# Patient Record
Sex: MALE | Race: BLACK OR AFRICAN AMERICAN | Employment: OTHER | URBAN - METROPOLITAN AREA
[De-identification: names, ages, dates, MRNs, and addresses within clinical notes are randomized per-mention and may not be internally consistent; named-entity substitution may affect disease eponyms.]

---

## 2017-06-19 ENCOUNTER — HOSPITAL ENCOUNTER (OUTPATIENT)
Dept: CT IMAGING | Age: 72
Discharge: HOME | End: 2017-06-19
Attending: OTOLARYNGOLOGY
Payer: MEDICARE

## 2017-06-19 DIAGNOSIS — K11.8 SUBMANDIBULAR GLAND MASS: ICD-10-CM

## 2017-06-19 LAB — CREAT BLD-MCNC: 1.2 MG/DL (ref 0.6–1.3)

## 2017-06-19 PROCEDURE — 82565 ASSAY OF CREATININE: CPT

## 2017-06-19 PROCEDURE — 74011250636 HC RX REV CODE- 250/636: Performed by: OTOLARYNGOLOGY

## 2017-06-19 PROCEDURE — 70491 CT SOFT TISSUE NECK W/DYE: CPT

## 2017-06-19 PROCEDURE — 74011636320 HC RX REV CODE- 636/320: Performed by: OTOLARYNGOLOGY

## 2017-06-19 RX ORDER — SODIUM CHLORIDE 9 MG/ML
50 INJECTION, SOLUTION INTRAVENOUS
Status: COMPLETED | OUTPATIENT
Start: 2017-06-19 | End: 2017-06-19

## 2017-06-19 RX ORDER — SODIUM CHLORIDE 0.9 % (FLUSH) 0.9 %
10 SYRINGE (ML) INJECTION
Status: COMPLETED | OUTPATIENT
Start: 2017-06-19 | End: 2017-06-19

## 2017-06-19 RX ADMIN — Medication 10 ML: at 11:31

## 2017-06-19 RX ADMIN — SODIUM CHLORIDE 50 ML/HR: 900 INJECTION, SOLUTION INTRAVENOUS at 11:31

## 2017-06-19 RX ADMIN — IOPAMIDOL 100 ML: 612 INJECTION, SOLUTION INTRAVENOUS at 11:31

## 2017-07-19 RX ORDER — POTASSIUM CHLORIDE 20 MEQ/1
20 TABLET, EXTENDED RELEASE ORAL DAILY
COMMUNITY

## 2017-07-19 RX ORDER — PRAVASTATIN SODIUM 10 MG/1
10 TABLET ORAL
COMMUNITY

## 2017-07-19 RX ORDER — ASPIRIN 81 MG/1
81 TABLET ORAL DAILY
COMMUNITY
End: 2017-07-25

## 2017-07-19 RX ORDER — AMIODARONE HYDROCHLORIDE 200 MG/1
100 TABLET ORAL DAILY
COMMUNITY

## 2017-07-19 RX ORDER — HYDROCHLOROTHIAZIDE 25 MG/1
25 TABLET ORAL DAILY
COMMUNITY

## 2017-07-19 RX ORDER — LISINOPRIL 40 MG/1
40 TABLET ORAL DAILY
COMMUNITY

## 2017-07-19 RX ORDER — METOPROLOL TARTRATE 50 MG/1
50 TABLET ORAL 2 TIMES DAILY
COMMUNITY

## 2017-07-19 NOTE — PERIOP NOTES
Call to [facility], [facility] rep. Notified of pt's upcoming surgery date, procedure, and use of bovie (per OR staff). Per Tosh, magnet needed for device for duration of case, no post op interrogation required.

## 2017-07-19 NOTE — PERIOP NOTES
[facility]  Ambulatory Surgery Unit  Pre-operative Instructions    Surgery/Procedure Date  7/25            Tentative Arrival Time tba      1. On the day of your surgery/procedure, please report to the Ambulatory Surgery Unit Registration Desk and sign in at your designated time. The Ambulatory Surgery Unit is located in [facility] on the [facility] side of the [facility] across from the [address] [place] [address]. Please have all of your health insurance cards and a photo ID. 2. You must have someone with you to drive you home, as you should not drive a car for 24 hours following anesthesia. Please make arrangements for a responsible adult friend or family member to stay with you for at least the first 24 hours after your surgery. 3. Do not have anything to eat or drink (including water, gum, mints, coffee, juice) after midnight 7/24. This may not apply to medications prescribed by your physician. (Please note below the special instructions with medications to take the morning of surgery, if applicable.)    4. We recommend you do not drink any alcoholic beverages for 24 hours before and after your surgery. 5. Stop all Aspirin, non-steroidal anti-inflammatory drugs (i.e. Advil, Aleve), vitamins, and supplements as directed by your surgeon's office. **If you are currently taking Plavix, Coumadin, or other blood-thinning agents, contact your surgeon for instructions. **    6. In an effort to help prevent surgical site infection, we ask that you shower with an anti-bacterial soap (i.e. Dial or Safeguard) for 3 days prior to and on the morning of surgery, using a fresh towel after each shower. (Please begin this process with fresh bed linens.) Do not apply any lotions, powders, or deodorants after the shower on the day of your procedure. If applicable, please do not shave the operative site for 48 hours prior to surgery. 7. Wear comfortable clothes. Wear glasses instead of contacts.  Do not bring any jewelry or money (other than copays or fees as instructed). Do not wear make-up, particularly mascara, the morning of your surgery. Do not wear nail polish, particularly if you are having foot /hand surgery. Wear your hair loose or down, no ponytails, buns, usman pins or clips. All body piercings must be removed. 8. You should understand that if you do not follow these instructions your surgery may be cancelled. If your physical condition changes (i.e. fever, cold or flu) please contact your surgeon as soon as possible. 9. It is important that you be on time. If a situation occurs where you may be late, or if you have any questions or problems, please call [phone].    10. Your surgery time may be subject to change. You will receive a phone call the day prior to surgery to confirm your arrival time. Special Instructions: Take all medications and inhalers, as prescribed, on the morning of surgery with a sip of water EXCEPT: none      I understand a pre-operative phone call will be made to verify my surgery time. In the event that I am not available, I give permission for a message to be left on my answering service and/or with another person?       yes    Reviewed by phone with wife, verbalized understanding.     ___________________      ___________________      ________________  (Signature of Patient)          (Witness)                   (Date and Time)

## 2017-07-21 ENCOUNTER — ANESTHESIA EVENT (OUTPATIENT)
Dept: SURGERY | Age: 72
End: 2017-07-21
Payer: MEDICARE

## 2017-07-24 NOTE — PERIOP NOTES
Pre op chart to Dr. Bob Martin for review, including pcp notes/labs and cardio notes. Also notified of [facility] rep instructions. Pt on HCTZ and K+ supplement every day. Per Dr. Bob Martin, ok to proceed with planned procedure without further.

## 2017-07-25 ENCOUNTER — ANESTHESIA (OUTPATIENT)
Dept: SURGERY | Age: 72
End: 2017-07-25
Payer: MEDICARE

## 2017-07-25 ENCOUNTER — HOSPITAL ENCOUNTER (OUTPATIENT)
Age: 72
Setting detail: OUTPATIENT SURGERY
Discharge: HOME | End: 2017-07-25
Attending: OTOLARYNGOLOGY | Admitting: OTOLARYNGOLOGY
Payer: MEDICARE

## 2017-07-25 VITALS
TEMPERATURE: 97.4 F | DIASTOLIC BLOOD PRESSURE: 96 MMHG | SYSTOLIC BLOOD PRESSURE: 175 MMHG | WEIGHT: 151 LBS | RESPIRATION RATE: 18 BRPM | HEART RATE: 74 BPM | OXYGEN SATURATION: 96 % | HEIGHT: 64 IN | BODY MASS INDEX: 25.78 KG/M2

## 2017-07-25 PROCEDURE — 74011000250 HC RX REV CODE- 250

## 2017-07-25 PROCEDURE — 74011250636 HC RX REV CODE- 250/636: Performed by: OTOLARYNGOLOGY

## 2017-07-25 PROCEDURE — 74011250636 HC RX REV CODE- 250/636: Performed by: ANESTHESIOLOGY

## 2017-07-25 PROCEDURE — 77030013079 HC BLNKT BAIR HGGR 3M -A: Performed by: NURSE ANESTHETIST, CERTIFIED REGISTERED

## 2017-07-25 PROCEDURE — 77030002916 HC SUT ETHLN J&J -A: Performed by: OTOLARYNGOLOGY

## 2017-07-25 PROCEDURE — 77030020255 HC SOL INJ LR 1000ML BG: Performed by: OTOLARYNGOLOGY

## 2017-07-25 PROCEDURE — 77030019908 HC STETH ESOPH SIMS -A: Performed by: NURSE ANESTHETIST, CERTIFIED REGISTERED

## 2017-07-25 PROCEDURE — 77030002888 HC SUT CHRMC J&J -A: Performed by: OTOLARYNGOLOGY

## 2017-07-25 PROCEDURE — 77030021678 HC GLIDESCP STAT DISP VERT -B: Performed by: NURSE ANESTHETIST, CERTIFIED REGISTERED

## 2017-07-25 PROCEDURE — 74011250636 HC RX REV CODE- 250/636

## 2017-07-25 PROCEDURE — 76030000001 HC AMB SURG OR TIME 1 TO 1.5: Performed by: OTOLARYNGOLOGY

## 2017-07-25 PROCEDURE — 77030010507 HC ADH SKN DERMBND J&J -B: Performed by: OTOLARYNGOLOGY

## 2017-07-25 PROCEDURE — 77030008467 HC STPLR SKN COVD -B: Performed by: OTOLARYNGOLOGY

## 2017-07-25 PROCEDURE — 77030011640 HC PAD GRND REM COVD -A: Performed by: OTOLARYNGOLOGY

## 2017-07-25 PROCEDURE — 74011000250 HC RX REV CODE- 250: Performed by: OTOLARYNGOLOGY

## 2017-07-25 PROCEDURE — 77030008684 HC TU ET CUF COVD -B: Performed by: NURSE ANESTHETIST, CERTIFIED REGISTERED

## 2017-07-25 PROCEDURE — 76210000046 HC AMBSU PH II REC FIRST 0.5 HR: Performed by: OTOLARYNGOLOGY

## 2017-07-25 PROCEDURE — 77030018836 HC SOL IRR NACL ICUM -A: Performed by: OTOLARYNGOLOGY

## 2017-07-25 PROCEDURE — 76060000062 HC AMB SURG ANES 1 TO 1.5 HR: Performed by: OTOLARYNGOLOGY

## 2017-07-25 PROCEDURE — 88307 TISSUE EXAM BY PATHOLOGIST: CPT | Performed by: OTOLARYNGOLOGY

## 2017-07-25 PROCEDURE — 76210000034 HC AMBSU PH I REC 0.5 TO 1 HR: Performed by: OTOLARYNGOLOGY

## 2017-07-25 PROCEDURE — 77030018850 HC STOCK ANTIEMB THG COVD -A: Performed by: OTOLARYNGOLOGY

## 2017-07-25 RX ORDER — SODIUM CHLORIDE 0.9 % (FLUSH) 0.9 %
5-10 SYRINGE (ML) INJECTION AS NEEDED
Status: DISCONTINUED | OUTPATIENT
Start: 2017-07-25 | End: 2017-07-25 | Stop reason: HOSPADM

## 2017-07-25 RX ORDER — PHENYLEPHRINE HCL IN 0.9% NACL 0.4MG/10ML
SYRINGE (ML) INTRAVENOUS AS NEEDED
Status: DISCONTINUED | OUTPATIENT
Start: 2017-07-25 | End: 2017-07-25 | Stop reason: HOSPADM

## 2017-07-25 RX ORDER — FENTANYL CITRATE 50 UG/ML
25 INJECTION, SOLUTION INTRAMUSCULAR; INTRAVENOUS
Status: DISCONTINUED | OUTPATIENT
Start: 2017-07-25 | End: 2017-07-25 | Stop reason: HOSPADM

## 2017-07-25 RX ORDER — LIDOCAINE HYDROCHLORIDE 10 MG/ML
0.1 INJECTION, SOLUTION EPIDURAL; INFILTRATION; INTRACAUDAL; PERINEURAL AS NEEDED
Status: DISCONTINUED | OUTPATIENT
Start: 2017-07-25 | End: 2017-07-25 | Stop reason: HOSPADM

## 2017-07-25 RX ORDER — NALOXONE HYDROCHLORIDE 0.4 MG/ML
INJECTION, SOLUTION INTRAMUSCULAR; INTRAVENOUS; SUBCUTANEOUS AS NEEDED
Status: DISCONTINUED | OUTPATIENT
Start: 2017-07-25 | End: 2017-07-25 | Stop reason: HOSPADM

## 2017-07-25 RX ORDER — DIPHENHYDRAMINE HYDROCHLORIDE 50 MG/ML
12.5 INJECTION, SOLUTION INTRAMUSCULAR; INTRAVENOUS
Status: DISCONTINUED | OUTPATIENT
Start: 2017-07-25 | End: 2017-07-25 | Stop reason: HOSPADM

## 2017-07-25 RX ORDER — LIDOCAINE HYDROCHLORIDE 20 MG/ML
INJECTION, SOLUTION EPIDURAL; INFILTRATION; INTRACAUDAL; PERINEURAL AS NEEDED
Status: DISCONTINUED | OUTPATIENT
Start: 2017-07-25 | End: 2017-07-25 | Stop reason: HOSPADM

## 2017-07-25 RX ORDER — GLYCOPYRROLATE 0.2 MG/ML
INJECTION INTRAMUSCULAR; INTRAVENOUS AS NEEDED
Status: DISCONTINUED | OUTPATIENT
Start: 2017-07-25 | End: 2017-07-25 | Stop reason: HOSPADM

## 2017-07-25 RX ORDER — CLINDAMYCIN PHOSPHATE 900 MG/50ML
INJECTION INTRAVENOUS AS NEEDED
Status: DISCONTINUED | OUTPATIENT
Start: 2017-07-25 | End: 2017-07-25 | Stop reason: HOSPADM

## 2017-07-25 RX ORDER — CLINDAMYCIN HYDROCHLORIDE 150 MG/1
150 CAPSULE ORAL 3 TIMES DAILY
Qty: 21 CAP | Refills: 0 | Status: SHIPPED | OUTPATIENT
Start: 2017-07-25

## 2017-07-25 RX ORDER — HYDROMORPHONE HYDROCHLORIDE 1 MG/ML
.2-.5 INJECTION, SOLUTION INTRAMUSCULAR; INTRAVENOUS; SUBCUTANEOUS
Status: DISCONTINUED | OUTPATIENT
Start: 2017-07-25 | End: 2017-07-25 | Stop reason: HOSPADM

## 2017-07-25 RX ORDER — SUCCINYLCHOLINE CHLORIDE 20 MG/ML
INJECTION INTRAMUSCULAR; INTRAVENOUS AS NEEDED
Status: DISCONTINUED | OUTPATIENT
Start: 2017-07-25 | End: 2017-07-25 | Stop reason: HOSPADM

## 2017-07-25 RX ORDER — ROCURONIUM BROMIDE 10 MG/ML
INJECTION, SOLUTION INTRAVENOUS AS NEEDED
Status: DISCONTINUED | OUTPATIENT
Start: 2017-07-25 | End: 2017-07-25 | Stop reason: HOSPADM

## 2017-07-25 RX ORDER — SODIUM CHLORIDE, SODIUM LACTATE, POTASSIUM CHLORIDE, CALCIUM CHLORIDE 600; 310; 30; 20 MG/100ML; MG/100ML; MG/100ML; MG/100ML
25 INJECTION, SOLUTION INTRAVENOUS CONTINUOUS
Status: DISCONTINUED | OUTPATIENT
Start: 2017-07-25 | End: 2017-07-25 | Stop reason: HOSPADM

## 2017-07-25 RX ORDER — DEXAMETHASONE SODIUM PHOSPHATE 4 MG/ML
10 INJECTION, SOLUTION INTRA-ARTICULAR; INTRALESIONAL; INTRAMUSCULAR; INTRAVENOUS; SOFT TISSUE ONCE
Status: COMPLETED | OUTPATIENT
Start: 2017-07-25 | End: 2017-07-25

## 2017-07-25 RX ORDER — ONDANSETRON 2 MG/ML
INJECTION INTRAMUSCULAR; INTRAVENOUS AS NEEDED
Status: DISCONTINUED | OUTPATIENT
Start: 2017-07-25 | End: 2017-07-25 | Stop reason: HOSPADM

## 2017-07-25 RX ORDER — FENTANYL CITRATE 50 UG/ML
INJECTION, SOLUTION INTRAMUSCULAR; INTRAVENOUS AS NEEDED
Status: DISCONTINUED | OUTPATIENT
Start: 2017-07-25 | End: 2017-07-25 | Stop reason: HOSPADM

## 2017-07-25 RX ORDER — CEFAZOLIN SODIUM IN 0.9 % NACL 2 G/100 ML
2 PLASTIC BAG, INJECTION (ML) INTRAVENOUS ONCE
Status: COMPLETED | OUTPATIENT
Start: 2017-07-25 | End: 2017-07-25

## 2017-07-25 RX ORDER — LIDOCAINE HYDROCHLORIDE AND EPINEPHRINE 10; 10 MG/ML; UG/ML
INJECTION, SOLUTION INFILTRATION; PERINEURAL AS NEEDED
Status: DISCONTINUED | OUTPATIENT
Start: 2017-07-25 | End: 2017-07-25 | Stop reason: HOSPADM

## 2017-07-25 RX ORDER — SODIUM CHLORIDE 0.9 % (FLUSH) 0.9 %
5-10 SYRINGE (ML) INJECTION EVERY 8 HOURS
Status: DISCONTINUED | OUTPATIENT
Start: 2017-07-25 | End: 2017-07-25 | Stop reason: HOSPADM

## 2017-07-25 RX ORDER — NEOSTIGMINE METHYLSULFATE 1 MG/ML
INJECTION INTRAVENOUS AS NEEDED
Status: DISCONTINUED | OUTPATIENT
Start: 2017-07-25 | End: 2017-07-25 | Stop reason: HOSPADM

## 2017-07-25 RX ORDER — MORPHINE SULFATE 10 MG/ML
2 INJECTION, SOLUTION INTRAMUSCULAR; INTRAVENOUS
Status: DISCONTINUED | OUTPATIENT
Start: 2017-07-25 | End: 2017-07-25 | Stop reason: HOSPADM

## 2017-07-25 RX ORDER — SODIUM CHLORIDE 9 MG/ML
INJECTION, SOLUTION INTRAVENOUS
Status: DISCONTINUED | OUTPATIENT
Start: 2017-07-25 | End: 2017-07-25 | Stop reason: HOSPADM

## 2017-07-25 RX ORDER — ETOMIDATE 2 MG/ML
INJECTION INTRAVENOUS AS NEEDED
Status: DISCONTINUED | OUTPATIENT
Start: 2017-07-25 | End: 2017-07-25 | Stop reason: HOSPADM

## 2017-07-25 RX ADMIN — ONDANSETRON 4 MG: 2 INJECTION INTRAMUSCULAR; INTRAVENOUS at 10:09

## 2017-07-25 RX ADMIN — ROCURONIUM BROMIDE 15 MG: 10 INJECTION, SOLUTION INTRAVENOUS at 10:00

## 2017-07-25 RX ADMIN — NALOXONE HYDROCHLORIDE 0.1 MG: 0.4 INJECTION, SOLUTION INTRAMUSCULAR; INTRAVENOUS; SUBCUTANEOUS at 10:41

## 2017-07-25 RX ADMIN — NALOXONE HYDROCHLORIDE 0.11 MG: 0.4 INJECTION, SOLUTION INTRAMUSCULAR; INTRAVENOUS; SUBCUTANEOUS at 10:48

## 2017-07-25 RX ADMIN — ETOMIDATE 20 MG: 2 INJECTION INTRAVENOUS at 09:30

## 2017-07-25 RX ADMIN — SUCCINYLCHOLINE CHLORIDE 140 MG: 20 INJECTION INTRAMUSCULAR; INTRAVENOUS at 09:30

## 2017-07-25 RX ADMIN — GLYCOPYRROLATE 0.4 MG: 0.2 INJECTION INTRAMUSCULAR; INTRAVENOUS at 10:27

## 2017-07-25 RX ADMIN — FENTANYL CITRATE 100 MCG: 50 INJECTION, SOLUTION INTRAMUSCULAR; INTRAVENOUS at 09:30

## 2017-07-25 RX ADMIN — DEXAMETHASONE SODIUM PHOSPHATE 10 MG: 4 INJECTION, SOLUTION INTRAMUSCULAR; INTRAVENOUS at 08:25

## 2017-07-25 RX ADMIN — LIDOCAINE HYDROCHLORIDE 80 MG: 20 INJECTION, SOLUTION EPIDURAL; INFILTRATION; INTRACAUDAL; PERINEURAL at 09:30

## 2017-07-25 RX ADMIN — Medication 40 MCG: at 09:50

## 2017-07-25 RX ADMIN — FENTANYL CITRATE 50 MCG: 50 INJECTION, SOLUTION INTRAMUSCULAR; INTRAVENOUS at 10:09

## 2017-07-25 RX ADMIN — NEOSTIGMINE METHYLSULFATE 3 MG: 1 INJECTION INTRAVENOUS at 10:27

## 2017-07-25 RX ADMIN — CEFAZOLIN 0.1 G: 10 INJECTION, POWDER, FOR SOLUTION INTRAVENOUS; PARENTERAL at 09:28

## 2017-07-25 RX ADMIN — CLINDAMYCIN PHOSPHATE 900 MG: 900 INJECTION INTRAVENOUS at 09:45

## 2017-07-25 RX ADMIN — Medication 80 MCG: at 10:32

## 2017-07-25 RX ADMIN — NALOXONE HYDROCHLORIDE 0.1 MG: 0.4 INJECTION, SOLUTION INTRAMUSCULAR; INTRAVENOUS; SUBCUTANEOUS at 10:45

## 2017-07-25 RX ADMIN — ROCURONIUM BROMIDE 10 MG: 10 INJECTION, SOLUTION INTRAVENOUS at 09:30

## 2017-07-25 RX ADMIN — SODIUM CHLORIDE, SODIUM LACTATE, POTASSIUM CHLORIDE, AND CALCIUM CHLORIDE 25 ML/HR: 600; 310; 30; 20 INJECTION, SOLUTION INTRAVENOUS at 08:20

## 2017-07-25 NOTE — ANESTHESIA POSTPROCEDURE EVALUATION
Post-Anesthesia Evaluation and Assessment    Patient: Ashley Crockett MRN: [id number]  SSN: [id number]    YOB: 1945  Age: 66 y.o. Sex: male       Cardiovascular Function/Vital Signs  Visit Vitals    BP (!) 175/96    Pulse 74    Temp 36.3 °C (97.4 °F)    Resp 18    Ht 5' 4\" (1.626 m)    Wt 68.5 kg (151 lb)    SpO2 96%    BMI 25.92 kg/m2       Patient is status post general anesthesia for Procedure(s):  EXCISION OF RIGHT SUBMANDIBULAR GLAND. Nausea/Vomiting: None    Postoperative hydration reviewed and adequate. Pain:  Pain Scale 1: Numeric (0 - 10) (07/25/17 1153)  Pain Intensity 1: 0 (07/25/17 1153)   Managed    Neurological Status:   Neuro (WDL): Within Defined Limits (07/25/17 1153)  Neuro  Neurologic State: Alert (07/25/17 1153)  Orientation Level:  (oriented pt to surroundings) (07/25/17 1054)  Speech: Aphasic (comment) (says yes/no, nods head, follows commands) (07/25/17 1153)  LUE Motor Response: Spontaneous  (07/25/17 1054)  LLE Motor Response: Spontaneous  (07/25/17 1054)  RUE Motor Response: Weak (07/25/17 1054)  RLE Motor Response: Weak (07/25/17 1054)   At baseline    Mental Status and Level of Consciousness: Arousable    Pulmonary Status:   O2 Device: Room air (07/25/17 1153)   Adequate oxygenation and airway patent    Complications related to anesthesia: None    Post-anesthesia assessment completed.  No concerns    Signed By: King Pineda MD     July 25, 2017

## 2017-07-25 NOTE — DISCHARGE INSTRUCTIONS
[facility], TOREY Rondon. Yoel Marina M.D., Ph.D., F.A.C.S. [facility], [address] [facility]   [phone]       Salivary Gland Surgery Discharge Instructions:  Patient/family to change gauze dressing 2-3 times/day. Clean any crusts around drain site with peroxide and q-tips. Replace dressing at drain site as needed. Prescriptions for Norco has been filled prior to surgery. Take antibiotic as prescribed. Take pain medication as needed. Call Dr. Yoel Marina' office for any questions. DO NOT TAKE TYLENOL/ACETAMINOPHEN WITH PERCOCET, [facility], [address] TAKE NARCOTIC PAIN MEDICATIONS WITH FOOD   Narcotics tend to be constipating, we suggest taking a stool softener such as Colace or Miralax (follow package instructions). DO NOT DRIVE WHILE TAKING NARCOTIC PAIN MEDICATIONS. DO NOT TAKE SLEEPING MEDICATIONS OR ANTIANXIETY MEDICATIONS WHILE TAKING NARCOTIC PAIN MEDICATIONS,  ESPECIALLY THE NIGHT OF ANESTHESIA. CPAP PATIENTS BE SURE TO WEAR MACHINE WHENEVER NAPPING OR SLEEPING. DISCHARGE SUMMARY from Nurse    The following personal items collected during your admission are returned to you:   Dental Appliance: Dental Appliances: None  Vision: Visual Aid: Glasses  Hearing Aid:    Jewelry: Jewelry: None  Clothing: Clothing: Other (comment) (belonging bag)  Other Valuables: Other Valuables: Cane (placed on stretcher with belonging bag)  Valuables sent to safe:        PATIENT INSTRUCTIONS:    After General Anesthesia or Intravenous Sedation, for 24 hours or while taking prescription Narcotics:        Someone should be with you for the next 24 hours. For your own safety, a responsible adult must drive you home. · Limit your activities  · Recommended activity: Rest today, up with assistance today. Do not climb stairs or shower unattended for the next 24 hours.   · Please start with a soft bland diet and advance as tolerated (no nausea) to regular diet. · If you have a sore throat you should try the following: fluids, warm salt water gargles, or throat lozenges. If it does not improve after several days please follow up with your primary physician. · Do not drive and operate hazardous machinery  · Do not make important personal or business decisions  · Do  not drink alcoholic beverages  · If you have not urinated within 8 hours after discharge, please contact your surgeon on call. Report the following to your surgeon:  · Excessive pain, swelling, redness or odor of or around the surgical area  · Temperature over 100.5  · Nausea and vomiting lasting longer than 4 hours or if unable to take medications  · Any signs of decreased circulation or nerve impairment to extremity: change in color, persistent  numbness, tingling, coldness or increase pain      · You will receive a Post Operative Call from one of the Recovery Room Nurses on the day after your surgery to check on you. It is very important for us to know how you are recovering after your surgery. If you have an issue or need to speak with someone, please call your surgeon, do not wait for the post operative call. · You may receive an e-mail or letter in the mail from [place] regarding your experience with us in the Ambulatory Surgery Unit. Your feedback is valuable to us and we appreciate your participation in the survey. · If the above instructions are not adequate, please contact Lora Short RN, [facility] anesthesia Nurse Manager or our Anesthesiologist, at [phone]. If you are having problems after your surgery, call the physician at his office number. · We wish you a speedy recovery ? What to do at Home:      *  Please give a list of your current medications to your Primary Care Provider.     *  Please update this list whenever your medications are discontinued, doses are      changed, or new medications (including over-the-counter products) are added. *  Please carry medication information at all times in case of emergency situations. These are general instructions for a healthy lifestyle:    No smoking/ No tobacco products/ Avoid exposure to second hand smoke    Surgeon General's Warning:  Quitting smoking now greatly reduces serious risk to your health. Obesity, smoking, and sedentary lifestyle greatly increases your risk for illness    A healthy diet, regular physical exercise & weight monitoring are important for maintaining a healthy lifestyle    You may be retaining fluid if you have a history of heart failure or if you experience any of the following symptoms:  Weight gain of 3 pounds or more overnight or 5 pounds in a week, increased swelling in our hands or feet or shortness of breath while lying flat in bed. Please call your doctor as soon as you notice any of these symptoms; do not wait until your next office visit. Recognize signs and symptoms of STROKE:    B - Balance  E - Eyes    F-  Face looks uneven    A-  Arms unable to move or move even    S-  Speech slurred or non-existent    T-  Time-call 911 as soon as signs and symptoms begin-DO NOT go       Back to bed or wait to see if you get better-TIME IS BRAIN. If you have not received your influenza and/or pneumococcal vaccine, please follow up with your primary care physician. The discharge information has been reviewed with the patient and spouse. The patient and spouse verbalized understanding. Jesus Dudleyian THROMBOSIS AND PULMONARY EMBOLUS    SURGICAL PATIENTS  Surgical patients are the #1 risk for DVT and PE. WHAT IS DVT? WHAT IS PE?  DVT is a serious condition where blood clots develop deep in the veins of the legs.   PE occurs when a blood clot breaks loose from the wall of a vein and travels to the lungs blocking the pulmonary artery or one of its branches impairing blood flow from the heart, which could result in death. RISK FACTORS   Surgery lasting longer than 45 minutes   History of inflammatory bowel disease   Oral contraceptive or hormone replacement therapy   Immobilization   Varicose veins / swollen legs   Smoking    CHF / Acute MI / Irregular heart beat   Family history of thrombosis   General anesthesia greater than 2 hours   Obesity   Infection of less than one month   Less than 1 month postpartum   COPD / Pneumonia   Arthroscopic surgery   Malignancy / cancer   Spine surgery   Blood abnormalities   Stroke / Paralysis / Coma    SIGNS AND SYMPTOMS OF DEEP VEIN TROMBOSIS  Usually occurs in one leg, above or below the knee   Swelling - one calf or thigh may be larger than the other   Feeling increased warmth in the area of the leg that is swollen or painful   Leg pain, which may increase when standing or walking   Swelling along the vein of the leg   When swollen areas is pressed with a finger, a depression may remain   Tenderness of the leg that may be confined to one area   Change in leg color (bluish or red)    SIGNS AND SYMPTOMS OF PULMONARY EMBOLUS   Chest pain that gets worse with deep breath, coughing or chest movement   Coughing up blood   Sweating   Shortness of breath or difficulty breathing   Rapid heart beat   Lightheadedness    HOW TO REDUCE THE POSSIBLE RISK OF DVT   Exercise - simple activities as rotating ankles and wrists, wiggling toes and fingers, tightening and relaxing muscles in calves and thighs promotes circulation while recovering from surgery. Please do these exercises every hour during waking hours   MOISES hose - If you have been given white support hose while having surgery, wear them home. You may remove them for half and hour every 8-hour period and stop wearing them 48 hours after surgery or as prescribed by your physician.  MOISES hose may be reused for air travel or extended car travel   Take mediation as prescribed by your physician (Lovenox, Coumadin, Aspirin)   Resume your normal activities as soon as your doctor advises you to do so.  Remember, when traveling, to [place] your legs frequently. Wear non skid shoes when MOISES hose are on. They are very slippery!     PATIENTS WHO BELIEVE THEY MAY BE EXPERIENCING SIGNS AND SYMPTOMS OF DVT OR PE SHOULD SEEK MEDICAL HELP IMMEDIATELY

## 2017-07-25 NOTE — OP NOTES
[address], [address]   OP NOTE       Name:  Eileen Varela   MR#:  [id number]   :  1945   Account #:  [de-identified]    Surgery Date:  2017   Date of Adm:  2017       PREOPERATIVE DIAGNOSIS:  Chronic right submandibular gland  sialadenitis, sialolithiasis. POSTOPERATIVE DIAGNOSIS:  Chronic right submandibular gland  sialadenitis, sialolithiasis. PROCEDURES PERFORMED:  Excision of right submandibular gland. SURGEON: Ladarius Steele. MD Manolo    ANESTHESIA:  GETA    INDICATIONS: The patient is a 75-year-old male with a long history of   variable persisting right submandibular region pain and a firm swelling   involving the submandibular gland. A preoperative CT scan confirmed   the presence of multiple calcifications consistent with sialolithiasis   throughout the right submandibular gland. As the patient's symptoms   and findings have been refractory to medical therapy, surgical   intervention is recommended for removal of the problematic gland. The   alternatives, potential benefits, and possible risks of the procedure   were explained to the patient, who understood and requested to   proceed. DESCRIPTION OF PROCEDURE: The patient was brought to the   operating room, placed supine on the operating table, and following the   smooth induction of general endotracheal tube anesthesia, the table   was positioned for operation. The right neck skin was cleansed with   topical alcohol solution. Xylocaine 1% with 1:100,000 epinephrine was   injected in a horizontally oriented neck crease approximately 2   fingerbreadths below the margin of the right mandible. A routine   Betadine prep and drape was carried out. A 15 blade was used to crosshatch the previously proposed incision. A   submandibular skin incision was made and carried through the   subcutaneous soft tissues to the level of the platysma.  The   platysma was incised and with blunt dissection, the inferior aspect of   the right submandibular gland could be identified. The soft tissues   were elevated off of the right submandibular gland in a capsular   fashion. The facial vein and facial artery were of necessity ligated   below and above the gland as they could not be easily  from   the gland proper. Once the gland could be elevated, the underlying   lingual nerve could be visualized and was preserved as the   submandibular gland was released from its attachments at the inferior   aspect of the lingual nerve. The belly of the digastric was easily   visualized through the dissection. The hypoglossal nerve was allowed   to remain in anatomic position without disturbance. Once the gland   was completely removed, the wound was copiously irrigated with   sterile saline and inspected under Valsalva conditions. Once   meticulous hemostasis was confirmed, the incision was closed in   layers over a 1/4-inch Penrose drain. Interrupted 3-0 chromic and   running subcuticular 4-0 chromic was used to approximate the soft   tissues in the deep layers and the skin superficially, both anterior and   posterior to the exit of the Penrose drain. The Penrose drain was   secured with a nylon suture and a sterile dressing consisting of   Spandage and 4 x 4 sponges was applied. The patient was then aroused from general anesthesia, extubated in   the operating room, and transferred to the recovery area in satisfactory   condition. ESTIMATED BLOOD LOSS: 25 mL. COMPLICATIONS: None apparent. SPECIMENS REMOVED: Right submandibular gland.         Preston Garza MD      [address] [place] [address]   D:  07/25/2017   11:08   T:  07/25/2017   11:22   Job #:  [id number]

## 2017-07-25 NOTE — PERIOP NOTES
PACU~  1054-Pt remains very sleepy, restless, trying to lay on his side. Helped pt reposition. 1105- Pt more awake, very restless. Difficult to communicate with him, pt not following commands. Pt gets more agitated and restless while BP taken. Wife at bedside. 1110-Restless increasing, BP cuff moved to leg. 1130-Pt calmer/more awake, denies complaints. Discharge instructions reviewed with wife. Pt sipping diluted juice. 1145-Pt awake, follows simple commands; nods head yes/no, also answers yes/no, gives thumbs up sign. Says \"go home\" Attempted cough & deep breathe, pt only clears throat. 1200-Pt answers yes to \"ready to go home\", wife states she is ready and comfortable taking pt home. Gave her dressing supplies, questions answered specifically about diet and dressing changes. 1205-PIV removed. Assisted pt with dressing. Pt more interactive, saying a few more words and responding approp. 1218-Pt discharged home in stable condition via w/c to car, pt has his cane. Right sided weakness noted, though moves well.

## 2017-07-25 NOTE — IP AVS SNAPSHOT
[address] [facility] 
[phone] Patient: Alfred Edward MRN: [id number] You are allergic to the following No active allergies Recent Documentation Height Weight BMI Smoking Status 1.626 m 68.5 kg 25.92 kg/m2 Former Smoker Emergency Contacts Name Discharge Info Relation Home Work Mobile Malathi Love DISCHARGE CAREGIVER [3] Spouse [3]   [phone] Santy Terry  Daughter [21]   [phone] About your hospitalization You were admitted on:  July 25, 2017 You last received care in the:  [facility] PACU You were discharged on:  July 25, 2017 Unit phone number:  [phone] Why you were hospitalized Your primary diagnosis was:  Not on File Providers Seen During Your Hospitalizations Provider Role Specialty Primary office phone Cipriano Gomez MD Attending Provider Otolaryngology [phone] Your Primary Care Physician (PCP) Primary Care Physician Office Phone Office Fax Ivy Easley [phone] Follow-up Information Follow up With Details Comments Contact Info Fabi Parish MD   [facility] [address] [address] 
[phone] Current Discharge Medication List  
  
START taking these medications Dose & Instructions Dispensing Information Comments Morning Noon Evening Bedtime  
 clindamycin 150 mg capsule Commonly known as:  CLEOCIN Your last dose was: Your next dose is:    
   
   
 Dose:  150 mg Take 1 Cap by mouth three (3) times daily. Quantity:  21 Cap Refills:  0 CONTINUE these medications which have NOT CHANGED Dose & Instructions Dispensing Information Comments Morning Noon Evening Bedtime  
 amiodarone 200 mg tablet Commonly known as:  CORDARONE Your last dose was: Your next dose is:    
   
   
 Dose:  100 mg Take 100 mg by mouth daily. Refills:  0  
     
   
   
   
  
 hydroCHLOROthiazide 25 mg tablet Commonly known as:  HYDRODIURIL Your last dose was: Your next dose is:    
   
   
 Dose:  25 mg Take 25 mg by mouth daily. Indications: hypertension Refills:  0  
     
   
   
   
  
 lisinopril 40 mg tablet Commonly known as:  Rosey Husbands Your last dose was: Your next dose is:    
   
   
 Dose:  40 mg Take 40 mg by mouth daily. Indications: hypertension Refills:  0  
     
   
   
   
  
 metoprolol tartrate 50 mg tablet Commonly known as:  LOPRESSOR Your last dose was: Your next dose is:    
   
   
 Dose:  50 mg Take 50 mg by mouth two (2) times a day. Refills:  0  
     
   
   
   
  
 potassium chloride 20 mEq tablet Commonly known as:  K-DUR, KLOR-CON Your last dose was: Your next dose is:    
   
   
 Dose:  20 mEq Take 20 mEq by mouth daily. Indications: hypokalemia prevention Refills:  0  
     
   
   
   
  
 pravastatin 10 mg tablet Commonly known as:  PRAVACHOL Your last dose was: Your next dose is:    
   
   
 Dose:  10 mg Take 10 mg by mouth nightly. Indications: hypercholesterolemia Refills:  0 ZyrTEC 10 mg Cap Generic drug:  Cetirizine Your last dose was: Your next dose is:    
   
   
 Dose:  1 Tab Take 1 Tab by mouth daily as needed. Refills:  0 STOP taking these medications   
 aspirin delayed-release 81 mg tablet Where to Get Your Medications These medications were sent to [facility], [address] [address] Phone:  [phone]  
  clindamycin 150 mg capsule Discharge Instructions [facility]  
 Nicolle Gonzales. Joseph Robertson M.D., Ph.D., F.A.C.S. Otolaryngology [address] [facility] [address] [place], [address] [facility]  
[phone] Salivary Gland Surgery Discharge Instructions: 
Patient/family to change gauze dressing 2-3 times/day. Clean any crusts around drain site with peroxide and q-tips. Replace dressing at drain site as needed. Prescriptions for Norco has been filled prior to surgery. Take antibiotic as prescribed. Take pain medication as needed. Call Dr. Joseph Robertson' office for any questions. DO NOT TAKE TYLENOL/ACETAMINOPHEN WITH PERCOCET, LORTAB, [address] TAKE NARCOTIC PAIN MEDICATIONS WITH FOOD Narcotics tend to be constipating, we suggest taking a stool softener such as Colace or Miralax (follow package instructions). DO NOT DRIVE WHILE TAKING NARCOTIC PAIN MEDICATIONS. DO NOT TAKE SLEEPING MEDICATIONS OR ANTIANXIETY MEDICATIONS WHILE TAKING NARCOTIC PAIN MEDICATIONS,  ESPECIALLY THE NIGHT OF ANESTHESIA. CPAP PATIENTS BE SURE TO WEAR MACHINE WHENEVER NAPPING OR SLEEPING. DISCHARGE SUMMARY from Nurse The following personal items collected during your admission are returned to you:  
Dental Appliance: Dental Appliances: None Vision: Visual Aid: Glasses Hearing Aid:   
Jewelry: Jewelry: None Clothing: Clothing: Other (comment) (belonging bag) Other Valuables: Other Valuables: Cane (placed on stretcher with belonging bag) Valuables sent to safe:   
 
 
PATIENT INSTRUCTIONS: 
 
 
B - Balance E - Eyes F-  Face looks uneven A-  Arms unable to move or move even S-  Speech slurred or non-existent T-  Time-call 911 as soon as signs and symptoms begin-DO NOT go Back to bed or wait to see if you get better-TIME IS BRAIN. If you have not received your influenza and/or pneumococcal vaccine, please follow up with your primary care physician. The discharge information has been reviewed with the patient and spouse. The patient and spouse verbalized understanding. [address] THROMBOSIS AND PULMONARY EMBOLUS 
 
SURGICAL PATIENTS Surgical patients are the #1 risk for DVT and PE. WHAT IS DVT? WHAT IS PE? 
DVT is a serious condition where blood clots develop deep in the veins of the legs. PE occurs when a blood clot breaks loose from the wall of a vein and travels to the lungs blocking the pulmonary artery or one of its branches impairing blood flow from the heart, which could result in death. RISK FACTORS 
? Surgery lasting longer than 45 minutes ? History of inflammatory bowel disease 
? Oral contraceptive or hormone replacement therapy ? Immobilization ? Varicose veins / swollen legs ? Smoking  
? CHF / Acute MI / Irregular heart beat ? Family history of thrombosis ? General anesthesia greater than 2 hours ? Obesity ? Infection of less than one month ? Less than 1 month postpartum 
? COPD / Pneumonia ? Arthroscopic surgery ? Malignancy / cancer ? Spine surgery ? Blood abnormalities ? Stroke / Paralysis / Coma SIGNS AND SYMPTOMS OF DEEP VEIN TROMBOSIS Usually occurs in one leg, above or below the knee ? Swelling  one calf or thigh may be larger than the other ? Feeling increased warmth in the area of the leg that is swollen or painful ? Leg pain, which may increase when standing or walking ? Swelling along the vein of the leg ? When swollen areas is pressed with a finger, a depression may remain ? Tenderness of the leg that may be confined to one area ? Change in leg color (bluish or red) SIGNS AND SYMPTOMS OF PULMONARY EMBOLUS 
? Chest pain that gets worse with deep breath, coughing or chest movement ? Coughing up blood ? Sweating ? Shortness of breath or difficulty breathing ? Rapid heart beat ? Lightheadedness HOW TO REDUCE THE POSSIBLE RISK OF DVT ? Exercise  simple activities as rotating ankles and wrists, wiggling toes and fingers, tightening and relaxing muscles in calves and thighs promotes circulation while recovering from surgery. Please do these exercises every hour during waking hours ? MOISES hose  If you have been given white support hose while having surgery, wear them home. You may remove them for half and hour every 8-hour period and stop wearing them 48 hours after surgery or as prescribed by your physician. MOISES hose may be reused for air travel or extended car travel ? Take mediation as prescribed by your physician (Lovenox, Coumadin, Aspirin) ? Resume your normal activities as soon as your doctor advises you to do so. 
? Remember, when traveling, to [place] your legs frequently. Wear non skid shoes when MOISES hose are on. They are very slippery! PATIENTS WHO BELIEVE THEY MAY BE EXPERIENCING SIGNS AND SYMPTOMS OF DVT OR PE SHOULD SEEK MEDICAL HELP IMMEDIATELY Discharge Instructions Attachments/References MEFS - HYDROCODONE/ACETAMINOPHEN (VICODIN, Hurman Grist, LORTAB) - (BY MOUTH) (ENGLISH) Discharge Orders None Introducing [facility]! [employer] introduces [employer] patient portal. Now you can access parts of your medical record, email your doctor's office, and request medication refills online. 1. In your internet browser, go to [email or web address] 2. Click on the First Time User? Click Here link in the Sign In box. You will see the New Member Sign Up page. 3. Enter your [employer] Access Code exactly as it appears below. You will not need to use this code after youve completed the sign-up process. If you do not sign up before the expiration date, you must request a new code. · [employer] Access Code: [email or web address] Expires: 9/11/2017  1:46 PM 
 
4. Enter the last four digits of your Social Security Number (xxxx) and Date of Birth (mm/dd/yyyy) as indicated and click Submit. You will be taken to the next sign-up page. 5. Create a [employer] ID. This will be your [employer] login ID and cannot be changed, so think of one that is secure and easy to remember. 6. Create a [employer] password. You can change your password at any time. 7. Enter your Password Reset Question and Answer. This can be used at a later time if you forget your password. 8. Enter your e-mail address. You will receive e-mail notification when new information is available in [address] 9. Click Sign Up. You can now view and download portions of your medical record. 10. Click the Download Summary menu link to download a portable copy of your medical information. If you have questions, please visit the Frequently Asked Questions section of the [employer] website. Remember, [employer] is NOT to be used for urgent needs. For medical emergencies, dial 911. Now available from your iPhone and Android! General Information Please provide this summary of care documentation to your next provider. Patient Signature:  ____________________________________________________________ Date:  ____________________________________________________________  
  
Maria Teresa Whyte Provider Signature:  ____________________________________________________________ Date:  ____________________________________________________________ More Information Hydrocodone/Acetaminophen (Vicodin, Norco, Lortab) - (By mouth) Why this medicine is used:  
Treats pain. Contact a nurse or doctor right away if you have: · Blistering, peeling, red skin rash · Fast or slow heartbeat, shallow breathing, blue lips, fingernails, or skin · Anxiety, restlessness, muscle spasms, twitching, seeing or hearing things that are not there · Dark urine or pale stools, yellow skin or eyes · Extreme weakness, sweating, seizures, cold or clammy skin · Lightheadedness, dizziness, fainting, fever, sweating Common side effects: 
· Constipation, nausea, vomiting, loss of appetite, stomach pain · Tiredness or sleepiness © 2017 [employer] Gareth Mccain Information is for End User's use only and may not be sold, redistributed or otherwise used for commercial purposes.

## 2017-07-25 NOTE — ANESTHESIA PREPROCEDURE EVALUATION
Anesthetic History   No history of anesthetic complications            Review of Systems / Medical History  Patient summary reviewed, nursing notes reviewed and pertinent labs reviewed    Pulmonary  Within defined limits                 Neuro/Psych       CVA  TIA     Cardiovascular    Hypertension        Dysrhythmias : atrial fibrillation and SVT  Pacemaker (Aicd), past MI and CAD    Exercise tolerance: <4 METS  Comments: EF 35%    BB taken this am.    Cleared by cardiology last week-paper note in chart. GI/Hepatic/Renal     GERD: well controlled           Endo/Other  Within defined limits           Other Findings            Physical Exam    Airway  Mallampati: IV  TM Distance: 4 - 6 cm  Neck ROM: normal range of motion   Mouth opening: Diminished (comment)     Cardiovascular  Regular rate and rhythm,  S1 and S2 normal,  no murmur, click, rub, or gallop             Dental  No notable dental hx       Pulmonary  Breath sounds clear to auscultation               Abdominal  GI exam deferred       Other Findings            Anesthetic Plan    ASA: 4  Anesthesia type: general            Anesthetic plan and risks discussed with: Patient and Spouse      Use magnet over ICD and have glidescope in OR as pt does not open mouth well and due to neck mass.

## 2017-07-25 NOTE — PERIOP NOTES
Deangelo Corey  1945  [id number]    Situation:  Verbal report given from:  TITO Del Toro  Procedure: Procedure(s):  EXCISION OF RIGHT SUBMANDIBULAR GLAND    Background:    Preoperative diagnosis: NECK MASS    Postoperative diagnosis: NECK MASS    :  Dr. Harry Mckinney    Assistant(s): Circ-1: Abelino Squibb Tech-1: Nisreen Henao  Surg Asst-1: Yaz Mckinney    Specimens:   ID Type Source Tests Collected by Time Destination   1 : Right submandibular gland Preservative SUBMANDIBULAR  Conner Wright MD 7/25/2017 1025 Pathology       Assessment:  Intra-procedure medications         Anesthesia gave intra-procedure sedation and medications, see anesthesia flow sheet     Intravenous fluids: LR@ KVO     Vital signs stable       Recommendation:    Permission to share finding with family or friend yes

## 2017-07-25 NOTE — BRIEF OP NOTE
BRIEF OPERATIVE NOTE    Date of Procedure: 7/25/2017   Preoperative Diagnosis: CHRONIC RIGHT SUBMANDIBULAR SIALADENITIS, SIALOLITHIASIS  Postoperative Diagnosis: CHRONIC RIGHT SUBMANDIBULAR SIALADENITIS, SIALOLITHIASIS    Procedure(s):  EXCISION OF RIGHT SUBMANDIBULAR GLAND  Surgeon(s) and Role:     * Rufino Garza MD - Primary         Assistant Staff:       Surgical Staff:  Circ-1: Delfina Quiroga  Scrub Tech-1: Charisse Powell  Surg Asst-1: Kasey Gant  Event Time In   Incision Start 9244   Incision Close 1033     Anesthesia: General   Estimated Blood Loss: 25 ml  Specimens:   ID Type Source Tests Collected by Time Destination   1 : Right submandibular gland Preservative CRUZ Garza MD 7/25/2017 1025 Pathology      Findings: as expected  Complications: none apparent  Implants: * No implants in log *

## 2022-01-28 ENCOUNTER — OFFICE VISIT (OUTPATIENT)
Dept: HEMATOLOGY | Age: 77
End: 2022-01-28

## 2022-01-28 VITALS
SYSTOLIC BLOOD PRESSURE: 110 MMHG | OXYGEN SATURATION: 97 % | BODY MASS INDEX: 24.92 KG/M2 | TEMPERATURE: 96.8 F | RESPIRATION RATE: 16 BRPM | HEART RATE: 55 BPM | WEIGHT: 146 LBS | DIASTOLIC BLOOD PRESSURE: 61 MMHG | HEIGHT: 64 IN

## 2022-01-28 DIAGNOSIS — I25.5 ISCHEMIC CARDIOMYOPATHY: ICD-10-CM

## 2022-01-28 DIAGNOSIS — R94.5 ABNORMAL RESULTS OF LIVER FUNCTION STUDIES: ICD-10-CM

## 2022-01-28 DIAGNOSIS — R16.0 LIVER MASS: Primary | ICD-10-CM

## 2022-01-28 DIAGNOSIS — Z95.810 AICD (AUTOMATIC CARDIOVERTER/DEFIBRILLATOR) PRESENT: ICD-10-CM

## 2022-01-28 DIAGNOSIS — R74.8 ELEVATED LIVER ENZYMES: ICD-10-CM

## 2022-01-28 DIAGNOSIS — I25.2 MI, OLD: ICD-10-CM

## 2022-01-28 PROCEDURE — 99203 OFFICE O/P NEW LOW 30 MIN: CPT | Performed by: INTERNAL MEDICINE

## 2022-01-28 RX ORDER — CLOPIDOGREL BISULFATE 75 MG/1
TABLET ORAL
COMMUNITY
Start: 2022-01-17

## 2022-01-28 RX ORDER — SPIRONOLACTONE 25 MG/1
TABLET ORAL
COMMUNITY
Start: 2022-01-17

## 2022-01-28 RX ORDER — METOPROLOL SUCCINATE 50 MG/1
TABLET, EXTENDED RELEASE ORAL
COMMUNITY
Start: 2021-10-26

## 2022-01-28 RX ORDER — NITROGLYCERIN 0.4 MG/1
TABLET SUBLINGUAL
COMMUNITY
Start: 2021-07-24

## 2022-01-28 RX ORDER — MEMANTINE HYDROCHLORIDE 10 MG/1
10 TABLET ORAL 2 TIMES DAILY
COMMUNITY
Start: 2021-12-20

## 2022-01-28 RX ORDER — HYDROCORTISONE ACETATE 25 MG/1
SUPPOSITORY RECTAL
COMMUNITY
Start: 2021-11-21

## 2022-01-28 RX ORDER — OMEPRAZOLE 20 MG/1
1 CAPSULE, DELAYED RELEASE ORAL
COMMUNITY
Start: 2014-06-03

## 2022-01-28 RX ORDER — GUAIFENESIN 100 MG/5ML
1 LIQUID (ML) ORAL
COMMUNITY
Start: 2012-10-03

## 2022-01-28 RX ORDER — MAGNESIUM 200 MG
1000 TABLET ORAL DAILY
COMMUNITY
Start: 2021-08-05

## 2022-01-28 NOTE — Clinical Note
2/13/2022    Patient: La Barlow   YOB: 1945   Date of Visit: 1/28/2022     Jennifer Das MD  [address] [place] [id number]  Via Fax: [phone]    Dear Jennifer Das MD,      Thank you for referring Mr. Brenda Duckworth to [facility] for evaluation. My notes for this consultation are attached. If you have questions, please do not hesitate to call me. I look forward to following your patient along with you.       Sincerely,    Nisreen Merlos MD

## 2022-01-28 NOTE — PROGRESS NOTES
[facility], [place], [address], [facility], [place]      Nitish Jefferson, PAMARIA Doe, ACNP-MICHELINE Ramos S Hilario, AGPCNP-BC   Ramón Daylin, MABEL Mclain, [facility]       [place] [address]    at [address], [address] Agustin Cook  22.    [phone]    FAX: [address] [place] [address] Avenue    at [address], [address], [address]    [phone]    FAX: [phone]       Patient Care Team:  ROSHAN Pereyra as PCP - General (Nurse Practitioner)      Problem List  Date Reviewed: 2/13/2022          Codes Class Noted    PAF (paroxysmal atrial fibrillation) (HCC) ICD-10-CM: I48.0  ICD-9-CM: 427.31  Unknown        Ischemic cardiomyopathy ICD-10-CM: I25.5  ICD-9-CM: 414.8  Unknown    Overview Signed 2/13/2022  3:47 AM by Reuben Stokes MD     7/2017 cardio note \"most recent LV EF 35%\"             High cholesterol ICD-10-CM: E78.00  ICD-9-CM: 272.0  Unknown        Hypertension ICD-10-CM: I10  ICD-9-CM: 401.9  Unknown        GERD (gastroesophageal reflux disease) ICD-10-CM: K21.9  ICD-9-CM: 530.81  Unknown    Overview Signed 2/13/2022  3:48 AM by Reuben Stokes MD     wife reports not a current problem, on no medications             Chronic systolic (congestive) heart failure ([facility] [address] ICD-10-CM: I50.22  ICD-9-CM: 428.22, 428.0  Unknown        AICD (automatic cardioverter/defibrillator) present ICD-10-CM: Z95.810  ICD-9-CM: V45.02  Unknown    Overview Signed 2/13/2022  3:49 AM by Reuben Stokes MD     [facility]             Elevated liver enzymes ICD-10-CM: R74.8  ICD-9-CM: 790.5  Unknown        H/O: stroke ICD-10-CM: Z86.73  ICD-9-CM: V12.54  2010    Overview Signed 2/13/2022  3:46 AM by Reuben Stokes MD     Residual surred speech and Right sided weakness             MI, old ICD-10-CM: I25.2  ICD-9-CM: 489  2004    Overview Signed 2/13/2022  3:47 AM by Stephania Dallas MD     per wife                   The clinicians listed above have asked me to see Vijay Gamez in consultation regarding elevated liver enzymes and its management. All medical records sent by the referring physicians were reviewed including imaging studies     The patient is a 64 y.o. Black male who was found to have elevated liver transaminases in 1/2022. Serologic evaluation for markers of chronic liver disease was negative. Ultrasound of the liver was performed in 1/2022. The results of the imaging demonstrated a normal appearing liver with a single lesion measuring 1.6 cm in the left lobe    An assessment of liver fibrosis with biopsy, Fibroscan or elastography has not been performed. The patient has been taking amioderone and pravastatin since 2018. These medications was discontinued when the liver enzymes were noted to be elevated. The patient does not have any symptoms which could be attributed to the liver disorder. fatigue,     The patient is not experiencing the following symptoms which are commonly seen in this liver disorder:   pain in the right side over the liver,     The patient has limitations in functional activities which can be attributed to other medical problems that are not related to the liver disease      ASSESSMENT AND PLAN:  Elevated liver enzymes  Persistent elevation in liver transaminases of unclear etiology at this time. Have performed laboratory testing to monitor liver function and degree of liver injury. This included BMP, hepatic panel, CBC with platelet count, INR. Liver transaminases are elevated. ALP is normal.  Liver function is normal.  The platelet count is normal.      Based upon laboratory studies and imaging the patient does not appear to have significant liver injury.     Serologic testing for causes of chronic liver disease was positive for LAVONNE. The most likely causes for the liver chemistry abnormalities were discussed with the patient and include fatty liver disease, immune liver disorders, medications,     The need to perform an assessment of liver fibrosis was discussed with the patient. The Fibroscan can assess liver fibrosis and determine if a patient has advanced fibrosis or cirrhosis without the need for liver biopsy. This will be performed at the next office visit. If the Fibroscan suggests advanced fibrosis then a liver biopsy should be considered. The Fibroscan can be repeated annually or as often as clinically indicated to assess for fibrosis progression and/or regression. If the Fibroscan suggests there is none or minimal liver injury the patient will be monitored at 6 month intervals. In this case liver disease will not contribute to morbidity or mortality given other medical issues. There is no reason to perform liver biopsy at this time. This may be necessary pending results of Fibroscan. Elevation in Ferritin  There is an elevation in ferritin with a nor,mal Iron saturation   The patient does not have hemochromatosis and is unlikely to be a carrier for an HFE gene. Suspect the elevation in ferritin reflects hepatic inflammation. Liver mass   This is most likely to be a hemangioma. Hemangiomas are benign and in general do not increase in size over time. Will obtain triple phase CT scan. Screening for Hepatocellular Carcinoma  HCC screening is not necessary if the patient has no evidence of cirrhosis. Treatment of other medical problems in patients with chronic liver disease  There are no contraindications for the patient to take most medications that are necessary for treatment of other medical issues. The patient has cirrhrosis and should avoid taking NSAIDs which are associated with a higher rate of developing LINUS.       The patient had HE and should avoid taking Benzodiazapines which could exacerbate HE. The patient can take any medications utilized for treatment of DM, statins to treat hypercholesterolemia. The patient should remain off amioderone at this time. Counseling for alcohol in patients with chronic liver disease  The patient was counseled regarding alcohol consumption and the effect of alcohol on chronic liver disease. The patient does not consume any significant amount of alcohol. Vaccinations   The need for vaccination against viral hepatitis A and B will be assessed with serologic and instituted as appropriate. The patient has received COVID-19 vaccine. Routine vaccinations against other bacterial and viral agents can be performed as indicated. Annual flu vaccination should be administered if indicated. ALLERGIES  Allergies   Allergen Reactions    Atorvastatin Other (comments)     GI Intolerance    Cefazolin Rash     Resolved in 15 minutes after discontinuation       MEDICATIONS  Current Outpatient Medications   Medication Sig    aspirin 81 mg chewable tablet Take 1 Tablet by mouth.  omeprazole (PRILOSEC) 20 mg capsule Take 1 Capsule by mouth.  cyanocobalamin (VITAMIN B-12) 1,000 mcg sublingual tablet 1,000 mcg by SubLINGual route daily.  nitroglycerin (NITROSTAT) 0.4 mg SL tablet DISSOLVE ONE TABLET UNDER THE TONGUE EVERY 5 MINUTES AS NEEDED FOR CHEST PAIN. DO NOT EXCEED A TOTAL OF 3 DOSES IN 15 MINUTES    memantine (NAMENDA) 10 mg tablet Take 10 mg by mouth two (2) times a day.  metoprolol succinate (TOPROL-XL) 50 mg XL tablet     spironolactone (ALDACTONE) 25 mg tablet     clopidogreL (PLAVIX) 75 mg tab     Anucort-HC 25 mg supp INSERT 1 SUPPOSITORY RECTALLY TWICE DAILY FOR 6 DAYS    clindamycin (CLEOCIN) 150 mg capsule Take 1 Cap by mouth three (3) times daily.  pravastatin (PRAVACHOL) 10 mg tablet Take 10 mg by mouth nightly.  Indications: hypercholesterolemia    lisinopril (PRINIVIL, ZESTRIL) 40 mg tablet Take 40 mg by mouth daily. Indications: hypertension    hydroCHLOROthiazide (HYDRODIURIL) 25 mg tablet Take 25 mg by mouth daily. Indications: hypertension    potassium chloride (K-DUR, KLOR-CON) 20 mEq tablet Take 20 mEq by mouth daily. Indications: hypokalemia prevention    metoprolol tartrate (LOPRESSOR) 50 mg tablet Take 50 mg by mouth two (2) times a day.  amiodarone (CORDARONE) 200 mg tablet Take 100 mg by mouth daily.  Cetirizine (ZYRTEC) 10 mg cap Take 1 Tab by mouth daily as needed. No current facility-administered medications for this visit. SYSTEM REVIEW NOT RELATED TO LIVER DISEASE OR REVIEWED ABOVE:  Constitution systems: Negative for fever, chills, weight gain, weight loss. Eyes: Negative for visual changes. ENT: Negative for sore throat, painful swallowing. Respiratory: Negative for cough, hemoptysis, SOB. Cardiology: Negative for chest pain, palpitations. GI:  Negative for constipation or diarrhea. : Negative for urinary frequency, dysuria, hematuria, nocturia. Skin: Negative for rash. Hematology: Negative for easy bruising, blood clots. Musculo-skelatal: Negative for back pain, muscle pain, weakness. Neurologic: Negative for headaches, dizziness, vertigo, memory problems not related to HE. Psychology: Negative for anxiety, depression. FAMILY HISTORY:  There is no family history of liver disease. SOCIAL HISTORY:  The patient is . The patient has 3 children, and 5 grandchildren. The patient stopped using tobacco products in 2004. The patient has previously consumed alcohol in excess. The patient has been abstinent from alcohol since 2004. The patient used to work as construction and keyon.         PHYSICAL EXAMINATION:  Visit Vitals  /61 (BP 1 Location: Left upper arm, BP Patient Position: Sitting, BP Cuff Size: Adult)   Pulse (!) 55   Temp 96.8 °F (36 °C) (Temporal)   Resp 16   Ht 5' 4\" (1.626 m)   Wt 146 lb (66.2 kg)   SpO2 97%   BMI 25.06 kg/m²     General: No acute distress. Eyes: Sclera anicteric. ENT: No oral lesions. Thyroid normal.  Nodes: No adenopathy. Skin: No spider angiomata. No jaundice. No palmar erythema. Respiratory: Lungs clear to auscultation. Cardiovascular: Regular heart rate. No murmurs. No JVD. Abdomen: Soft non-tender. Liver size normal to percussion/palpation. Spleen not palpable. No obvious ascites. Extremities: No edema. No muscle wasting. No gross arthritic changes. Neurologic: Alert and oriented. Cranial nerves grossly intact. No asterixis. LABORATORY STUDIES:  [place] [address] Units 1/28/2022   WBC 4.1 - 11.1 K/uL 10.1   ANC 1.8 - 8.0 K/UL 7.4   HGB 12.1 - 17.0 g/dL 12.7    - 400 K/uL 217   INR 0.9 - 1.1   1.0   AST 15 - 37 U/L 114 (H)   ALT 12 - 78 U/L 338 (H)   Alk Phos 45 - 117 U/L 79   Bili, Total 0.2 - 1.0 MG/DL 1.0   Bili, Direct 0.0 - 0.2 MG/DL 0.3 (H)   Albumin 3.5 - 5.0 g/dL 3.6   BUN 6 - 20 MG/DL 23 (H)   Creat 0.70 - 1.30 MG/DL 1.19   Na 136 - 145 mmol/L 138   K 3.5 - 5.1 mmol/L 4.2   Cl 97 - 108 mmol/L 109 (H)   CO2 21 - 32 mmol/L 25   Glucose 65 - 100 mg/dL 100     SEROLOGIES:  12/2021. HBsurface antigen negative, anti-HCV negative, Ferritin 409, Fe saturation 31%, AMA negative, alpha-1-antitrypsin 150    Serologies Latest Ref Rng & Units 1/28/2022   LAVONNE, IFA  Positive (A)   LAVONNE Pattern  1:80   ASMCA 0 - 19 Units 8     LIVER HISTOLOGY:  Not available or performed    ENDOSCOPIC PROCEDURES:  Not available or performed    RADIOLOGY:  1/2022. Ultrasound of liver. Echogenic consistent with chronic liver disease. 1.6 cm hyperecholic lesion left lobe. No dilated bile ducts. No ascites. OTHER TESTING:  Not available or performed    FOLLOW-UP:  All of the issues listed above in the Assessment and Plan were discussed with the patient. All questions were answered.   The patient expressed a clear understanding of the above. [facility] 87 in 4 weeks to review all data and determine the treatment plan.       Rodolfo Iglesias [place]  [address]  4 [facility], [address] [place] [address], [address] [place] [address] - [address]  [address] [place] [address]

## 2022-01-28 NOTE — PROGRESS NOTES
Identified pt with two pt identifiers(name and ). Reviewed record in preparation for visit and have obtained necessary documentation. No chief complaint on file. Vitals:    22 0859   BP: 110/61   Pulse: (!) 55   Resp: 16   Temp: 96.8 °F (36 °C)   TempSrc: Temporal   SpO2: 97%   Weight: 146 lb (66.2 kg)   Height: 5' 4\" (1.626 m)   PainSc:   0 - No pain       Health Maintenance Review: Patient reminded of \"due or due soon\" health maintenance. I have asked the patient to contact his/her primary care provider (PCP) for follow-up on his/her health maintenance. Coordination of Care Questionnaire:  :   1) Have you been to an emergency room, urgent care, or hospitalized since your last visit? If yes, where when, and reason for visit? no       2. Have seen or consulted any other health care provider since your last visit? If yes, where when, and reason for visit? NO      Patient is accompanied by wife I have received verbal consent from Roney Robles to discuss any/all medical information while they are present in the room.

## 2022-01-29 LAB
ALBUMIN SERPL-MCNC: 3.6 G/DL (ref 3.5–5)
ALBUMIN/GLOB SERPL: 1.1 {RATIO} (ref 1.1–2.2)
ALP SERPL-CCNC: 79 U/L (ref 45–117)
ALT SERPL-CCNC: 338 U/L (ref 12–78)
ANION GAP SERPL CALC-SCNC: 4 MMOL/L (ref 5–15)
AST SERPL-CCNC: 114 U/L (ref 15–37)
BASOPHILS # BLD: 0 K/UL (ref 0–0.1)
BASOPHILS NFR BLD: 0 % (ref 0–1)
BILIRUB DIRECT SERPL-MCNC: 0.3 MG/DL (ref 0–0.2)
BILIRUB SERPL-MCNC: 1 MG/DL (ref 0.2–1)
BUN SERPL-MCNC: 23 MG/DL (ref 6–20)
BUN/CREAT SERPL: 19 (ref 12–20)
CALCIUM SERPL-MCNC: 9.3 MG/DL (ref 8.5–10.1)
CHLORIDE SERPL-SCNC: 109 MMOL/L (ref 97–108)
CO2 SERPL-SCNC: 25 MMOL/L (ref 21–32)
CREAT SERPL-MCNC: 1.19 MG/DL (ref 0.7–1.3)
DIFFERENTIAL METHOD BLD: ABNORMAL
EOSINOPHIL # BLD: 0.2 K/UL (ref 0–0.4)
EOSINOPHIL NFR BLD: 2 % (ref 0–7)
ERYTHROCYTE [DISTWIDTH] IN BLOOD BY AUTOMATED COUNT: 14.8 % (ref 11.5–14.5)
GLOBULIN SER CALC-MCNC: 3.4 G/DL (ref 2–4)
GLUCOSE SERPL-MCNC: 100 MG/DL (ref 65–100)
HCT VFR BLD AUTO: 41.4 % (ref 36.6–50.3)
HGB BLD-MCNC: 12.7 G/DL (ref 12.1–17)
IMM GRANULOCYTES # BLD AUTO: 0.1 K/UL (ref 0–0.04)
IMM GRANULOCYTES NFR BLD AUTO: 1 % (ref 0–0.5)
INR PPP: 1 (ref 0.9–1.1)
LYMPHOCYTES # BLD: 1.7 K/UL (ref 0.8–3.5)
LYMPHOCYTES NFR BLD: 17 % (ref 12–49)
MCH RBC QN AUTO: 30.2 PG (ref 26–34)
MCHC RBC AUTO-ENTMCNC: 30.7 G/DL (ref 30–36.5)
MCV RBC AUTO: 98.3 FL (ref 80–99)
MONOCYTES # BLD: 0.8 K/UL (ref 0–1)
MONOCYTES NFR BLD: 8 % (ref 5–13)
NEUTS SEG # BLD: 7.4 K/UL (ref 1.8–8)
NEUTS SEG NFR BLD: 72 % (ref 32–75)
NRBC # BLD: 0 K/UL (ref 0–0.01)
NRBC BLD-RTO: 0 PER 100 WBC
PLATELET # BLD AUTO: 217 K/UL (ref 150–400)
PMV BLD AUTO: 11.3 FL (ref 8.9–12.9)
POTASSIUM SERPL-SCNC: 4.2 MMOL/L (ref 3.5–5.1)
PROT SERPL-MCNC: 7 G/DL (ref 6.4–8.2)
PROTHROMBIN TIME: 10 SEC (ref 9–11.1)
RBC # BLD AUTO: 4.21 M/UL (ref 4.1–5.7)
SODIUM SERPL-SCNC: 138 MMOL/L (ref 136–145)
WBC # BLD AUTO: 10.1 K/UL (ref 4.1–11.1)

## 2022-01-30 LAB — ACTIN IGG SERPL-ACNC: 8 UNITS (ref 0–19)

## 2022-01-31 LAB
AFP L3 MFR SERPL: NORMAL % (ref 0–9.9)
AFP SERPL-MCNC: 3 NG/ML (ref 0–8)
ANA HOMOGEN TITR SER: NORMAL {TITER}
ANA TITR SER IF: POSITIVE {TITER}
NOTE:, 016270: NORMAL

## 2022-02-14 ENCOUNTER — HOSPITAL ENCOUNTER (OUTPATIENT)
Dept: CT IMAGING | Age: 77
Discharge: HOME | End: 2022-02-14
Attending: INTERNAL MEDICINE
Payer: MEDICARE

## 2022-02-14 DIAGNOSIS — R16.0 LIVER MASS: ICD-10-CM

## 2022-02-14 PROCEDURE — 74170 CT ABD WO CNTRST FLWD CNTRST: CPT

## 2022-02-14 PROCEDURE — 74011000636 HC RX REV CODE- 636: Performed by: INTERNAL MEDICINE

## 2022-02-14 RX ADMIN — IOPAMIDOL 100 ML: 755 INJECTION, SOLUTION INTRAVENOUS at 10:50

## 2022-02-15 ENCOUNTER — TELEPHONE (OUTPATIENT)
Dept: HEMATOLOGY | Age: 77
End: 2022-02-15

## 2022-02-15 NOTE — TELEPHONE ENCOUNTER
[email or web address] Spoke w/patient wife Herlinda Varela) which is on patient HIPPA form. Patient unable to understand information given so wife is POA. CT scan showed x2 hemangioma, small blood vessels, no treatment needed. Keep follow up appt. Wife verbalize understanding.  (KF)

## 2025-08-07 ENCOUNTER — OFFICE VISIT (OUTPATIENT)
Facility: CLINIC | Age: 80
End: 2025-08-07
Payer: MEDICARE

## 2025-08-07 VITALS
WEIGHT: 153.2 LBS | HEIGHT: 64 IN | BODY MASS INDEX: 26.15 KG/M2 | TEMPERATURE: 98.3 F | DIASTOLIC BLOOD PRESSURE: 78 MMHG | RESPIRATION RATE: 2 BRPM | HEART RATE: 69 BPM | SYSTOLIC BLOOD PRESSURE: 130 MMHG | OXYGEN SATURATION: 95 %

## 2025-08-07 DIAGNOSIS — E53.8 B12 DEFICIENCY: ICD-10-CM

## 2025-08-07 DIAGNOSIS — I50.22 CHRONIC SYSTOLIC (CONGESTIVE) HEART FAILURE (HCC): Primary | ICD-10-CM

## 2025-08-07 DIAGNOSIS — I10 PRIMARY HYPERTENSION: ICD-10-CM

## 2025-08-07 DIAGNOSIS — Z86.73 H/O: STROKE: ICD-10-CM

## 2025-08-07 DIAGNOSIS — J30.1 SEASONAL ALLERGIC RHINITIS DUE TO POLLEN: ICD-10-CM

## 2025-08-07 DIAGNOSIS — I48.0 PAF (PAROXYSMAL ATRIAL FIBRILLATION) (HCC): ICD-10-CM

## 2025-08-07 DIAGNOSIS — E78.00 HIGH CHOLESTEROL: ICD-10-CM

## 2025-08-07 PROCEDURE — G8419 CALC BMI OUT NRM PARAM NOF/U: HCPCS | Performed by: FAMILY MEDICINE

## 2025-08-07 PROCEDURE — 3078F DIAST BP <80 MM HG: CPT | Performed by: FAMILY MEDICINE

## 2025-08-07 PROCEDURE — 1123F ACP DISCUSS/DSCN MKR DOCD: CPT | Performed by: FAMILY MEDICINE

## 2025-08-07 PROCEDURE — 3075F SYST BP GE 130 - 139MM HG: CPT | Performed by: FAMILY MEDICINE

## 2025-08-07 PROCEDURE — 1126F AMNT PAIN NOTED NONE PRSNT: CPT | Performed by: FAMILY MEDICINE

## 2025-08-07 PROCEDURE — G2211 COMPLEX E/M VISIT ADD ON: HCPCS | Performed by: FAMILY MEDICINE

## 2025-08-07 PROCEDURE — 99203 OFFICE O/P NEW LOW 30 MIN: CPT | Performed by: FAMILY MEDICINE

## 2025-08-07 PROCEDURE — G8427 DOCREV CUR MEDS BY ELIG CLIN: HCPCS | Performed by: FAMILY MEDICINE

## 2025-08-07 PROCEDURE — 1036F TOBACCO NON-USER: CPT | Performed by: FAMILY MEDICINE

## 2025-08-07 RX ORDER — MULTIVITAMIN WITH IRON
500 TABLET ORAL EVERY OTHER DAY
Qty: 50 TABLET | Refills: 1 | Status: SHIPPED | OUTPATIENT
Start: 2025-08-07 | End: 2026-08-07

## 2025-08-07 RX ORDER — FUROSEMIDE 40 MG/1
40 TABLET ORAL DAILY
COMMUNITY

## 2025-08-07 RX ORDER — VALSARTAN 160 MG/1
160 TABLET ORAL DAILY
COMMUNITY

## 2025-08-07 RX ORDER — FLUTICASONE PROPIONATE 50 MCG
2 SPRAY, SUSPENSION (ML) NASAL DAILY
Qty: 16 G | Refills: 5 | Status: SHIPPED | OUTPATIENT
Start: 2025-08-07

## 2025-08-07 RX ORDER — METOPROLOL SUCCINATE 100 MG/1
100 TABLET, EXTENDED RELEASE ORAL NIGHTLY
COMMUNITY
Start: 2025-06-05

## 2025-08-07 RX ORDER — PRAVASTATIN SODIUM 40 MG
40 TABLET ORAL DAILY
COMMUNITY
Start: 2025-07-24

## 2025-08-07 SDOH — ECONOMIC STABILITY: FOOD INSECURITY: WITHIN THE PAST 12 MONTHS, THE FOOD YOU BOUGHT JUST DIDN'T LAST AND YOU DIDN'T HAVE MONEY TO GET MORE.: NEVER TRUE

## 2025-08-07 SDOH — ECONOMIC STABILITY: FOOD INSECURITY: WITHIN THE PAST 12 MONTHS, YOU WORRIED THAT YOUR FOOD WOULD RUN OUT BEFORE YOU GOT MONEY TO BUY MORE.: NEVER TRUE

## 2025-08-07 ASSESSMENT — PATIENT HEALTH QUESTIONNAIRE - PHQ9
SUM OF ALL RESPONSES TO PHQ QUESTIONS 1-9: 0
1. LITTLE INTEREST OR PLEASURE IN DOING THINGS: NOT AT ALL
2. FEELING DOWN, DEPRESSED OR HOPELESS: NOT AT ALL
SUM OF ALL RESPONSES TO PHQ QUESTIONS 1-9: 0

## (undated) DEVICE — REM POLYHESIVE ADULT PATIENT RETURN ELECTRODE: Brand: VALLEYLAB

## (undated) DEVICE — KENDALL DL ECG CABLE AND LEAD WIRE SYSTEM, 3-LEAD, SINGLE PATIENT USE: Brand: KENDALL

## (undated) DEVICE — INTENDED FOR TISSUE SEPARATION, AND OTHER PROCEDURES THAT REQUIRE A SHARP SURGICAL BLADE TO PUNCTURE OR CUT.: Brand: BARD-PARKER ® CARBON RIB-BACK BLADES

## (undated) DEVICE — RETAINER DSG SZ 6 L900IN TBLR E NET ORIG ST RL SPANDAGE S06] MEDI TECH INTERNATIONAL]

## (undated) DEVICE — SYR 10ML CTRL LR LCK NSAF LF --

## (undated) DEVICE — AMD ANTIMICROBIAL I.V. DRAIN SPONGES 6 PLY, 0.2% POLYHEXAMETHYLENE BIGUANIDE HCI (PHMB): Brand: EXCILON

## (undated) DEVICE — 1010 S-DRAPE TOWEL DRAPE 10/BX: Brand: STERI-DRAPE™

## (undated) DEVICE — NDL CTR 40/70 FM BLCK MAG: Brand: CARDINAL HEALTH

## (undated) DEVICE — TRAY PREP DRY W/ PREM GLV 2 APPL 6 SPNG 2 UNDPD 1 OVERWRAP

## (undated) DEVICE — 1200 GUARD II KIT W/5MM TUBE W/O VAC TUBE: Brand: GUARDIAN

## (undated) DEVICE — 3M™ MICROPORE™ TAPE, 1530-2: Brand: 3M™ MICROPORE™

## (undated) DEVICE — ANTI-EMBOLISM STOCKINGS,KNEE LENGTH,SMALL,REGULAR,SIZE A-: Brand: T.E.D.

## (undated) DEVICE — (D)SYR 10ML 1/5ML GRAD NSAF -- PKGING CHANGE USE ITEM 338027

## (undated) DEVICE — SUTURE CHROMIC GUT SZ 4-0 L18IN ABSRB BRN L13MM P-3 3/8 CIR 1654G

## (undated) DEVICE — SKIN MARKER,REGULAR TIP WITH RULER AND LABELS: Brand: DEVON

## (undated) DEVICE — MEDI-VAC NON-CONDUCTIVE SUCTION TUBING: Brand: CARDINAL HEALTH

## (undated) DEVICE — MAGNETIC INSTRUMENT PAD 10" X 16"; MEDIUM; DISPOSABLE: Brand: CARDINAL HEALTH

## (undated) DEVICE — FRAZIER SUCTION INSTRUMENT 12 FR W/CONTROL VENT & OBTURATOR: Brand: FRAZIER

## (undated) DEVICE — SOL IRRIGATION INJ NACL 0.9% 500ML BTL

## (undated) DEVICE — TOWEL SURG W17XL27IN STD BLU COT NONFENESTRATED PREWASHED

## (undated) DEVICE — LIGHT HANDLE: Brand: DEVON

## (undated) DEVICE — TIP SUCT BLU PLAS BLB W/O CTRL VENT YANK

## (undated) DEVICE — BIPOLAR FORCEPS CORD,BANANA LEADS: Brand: VALLEYLAB

## (undated) DEVICE — SYR IRR BLB 2OZ DISP BLU STRL -- CONVERT TO ITEM 357637

## (undated) DEVICE — NEEDLE HYPO 25GA L1.5IN BVL ORIENTED ECLIPSE

## (undated) DEVICE — DERMABOND SKIN ADH 0.7ML -- DERMABOND ADVANCED 12/BX

## (undated) DEVICE — INFECTION CONTROL KIT SYS

## (undated) DEVICE — STERILE POLYISOPRENE POWDER-FREE SURGICAL GLOVES: Brand: PROTEXIS

## (undated) DEVICE — SET 2ND L34IN N DEHP THE QUEENS MED CNTR VALUELINK

## (undated) DEVICE — MASTISOL ADHESIVE LIQ 2/3ML

## (undated) DEVICE — CONTINU-FLO SOLUTION SET, 2 INJECTION SITES, MALE LUER LOCK ADAPTER WITH RETRACTABLE COLLAR, LARGE BORE STOPCOCK WITH ROTATING MALE LUER LOCK EXTENSION SET, 2 INJECTION SITES, MALE LUER LOCK ADAPTER WITH RETRACTABLE COLLAR: Brand: INTERLINK/CONTINU-FLO

## (undated) DEVICE — NON-ADHERENT DRESSING: Brand: TELFA

## (undated) DEVICE — SUTURE ETHLN SZ 4-0 L18IN NONABSORBABLE BLK L19MM PS-2 3/8 1667H

## (undated) DEVICE — Device

## (undated) DEVICE — SPONGE: SPECIALTY PEANUT XR 100/CS: Brand: MEDICAL ACTION INDUSTRIES

## (undated) DEVICE — SLIM BODY SKIN STAPLER: Brand: APPOSE ULC

## (undated) DEVICE — GAUZE SPONGES,12 PLY: Brand: CURITY

## (undated) DEVICE — PENCIL ES L3M BTTN SWCH S STL HEX LOK BLDE ELECTRD HOLSTER

## (undated) DEVICE — PACK,EENT,TURBAN DRAPE,PK II: Brand: MEDLINE

## (undated) DEVICE — SOLUTION LACTATED RINGERS INJECTION USP

## (undated) DEVICE — X-RAY SPONGES,16 PLY: Brand: DERMACEA

## (undated) DEVICE — 3M™ TEGADERM™ TRANSPARENT FILM DRESSING FRAME STYLE, 1624W, 2-3/8 IN X 2-3/4 IN (6 CM X 7 CM), 100/CT 4CT/CASE: Brand: 3M™ TEGADERM™